# Patient Record
Sex: MALE | Race: WHITE | URBAN - NONMETROPOLITAN AREA
[De-identification: names, ages, dates, MRNs, and addresses within clinical notes are randomized per-mention and may not be internally consistent; named-entity substitution may affect disease eponyms.]

---

## 2022-01-31 ENCOUNTER — OFFICE VISIT (OUTPATIENT)
Dept: URBAN - NONMETROPOLITAN AREA CLINIC 6 | Facility: CLINIC | Age: 32
End: 2022-01-31
Payer: COMMERCIAL

## 2022-01-31 DIAGNOSIS — H52.223 REGULAR ASTIGMATISM, BILATERAL: Primary | ICD-10-CM

## 2022-01-31 DIAGNOSIS — H18.609 KERATOCONUS: ICD-10-CM

## 2022-01-31 PROCEDURE — 92004 COMPRE OPH EXAM NEW PT 1/>: CPT | Performed by: OPTOMETRIST

## 2022-01-31 ASSESSMENT — VISUAL ACUITY
OD: 20/40
OS: 20/40

## 2022-01-31 ASSESSMENT — INTRAOCULAR PRESSURE
OS: 15
OD: 15

## 2022-01-31 NOTE — IMPRESSION/PLAN
Impression: Keratoconus: Y28.983., bilateral Plan: Patient education regarding findings. Recommend patient see a corneal specialist. Discussed treatment options such as crosslinking. Dr. Susan Howard and Dr. Valentin Nogueira information given to patient.

## 2022-01-31 NOTE — IMPRESSION/PLAN
Impression: Regular astigmatism, bilateral: H52.223. Plan: patient education. see plan #2. Advised patient to wait until seeing a corneal specialist before updating glasses or CL at this time.

## 2022-06-14 ENCOUNTER — OFFICE VISIT (OUTPATIENT)
Dept: URBAN - NONMETROPOLITAN AREA CLINIC 6 | Facility: CLINIC | Age: 32
End: 2022-06-14

## 2022-06-14 DIAGNOSIS — H18.621 KERATOCONUS, UNSTABLE, RIGHT EYE: Primary | ICD-10-CM

## 2022-06-14 PROCEDURE — 76514 ECHO EXAM OF EYE THICKNESS: CPT | Performed by: STUDENT IN AN ORGANIZED HEALTH CARE EDUCATION/TRAINING PROGRAM

## 2022-06-14 PROCEDURE — 92025 CPTRIZED CORNEAL TOPOGRAPHY: CPT | Performed by: STUDENT IN AN ORGANIZED HEALTH CARE EDUCATION/TRAINING PROGRAM

## 2022-06-14 PROCEDURE — 99213 OFFICE O/P EST LOW 20 MIN: CPT | Performed by: STUDENT IN AN ORGANIZED HEALTH CARE EDUCATION/TRAINING PROGRAM

## 2022-06-14 ASSESSMENT — INTRAOCULAR PRESSURE
OS: 14
OD: 13

## 2022-06-14 NOTE — IMPRESSION/PLAN
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 Plan: Patient ed on findings. Questions regarding scleral lenses answered today. Fit performed. Will order new lens according to fit seen in clinic. Will call patient when lens arrives. Discussed diagnosis in detail with patient. Recommend scleral lens fitting. Will coordinate with eye care plan/medical insurance for billing. Scleral lens contract given and explained. Questions answered. [place]ide completed today. Consult and fitting done same day as patient has CXL scheduled with Dr. Randy Jorge in 2 weeks. Expedite shipping.

## 2022-06-20 ENCOUNTER — OFFICE VISIT (OUTPATIENT)
Dept: URBAN - NONMETROPOLITAN AREA CLINIC 6 | Facility: CLINIC | Age: 32
End: 2022-06-20
Payer: COMMERCIAL

## 2022-06-20 DIAGNOSIS — H52.223 REGULAR ASTIGMATISM, BILATERAL: ICD-10-CM

## 2022-06-20 DIAGNOSIS — H18.621 KERATOCONUS, UNSTABLE, RIGHT EYE: Primary | ICD-10-CM

## 2022-06-20 PROCEDURE — 92310 CONTACT LENS FITTING OU: CPT | Performed by: STUDENT IN AN ORGANIZED HEALTH CARE EDUCATION/TRAINING PROGRAM

## 2022-06-20 NOTE — IMPRESSION/PLAN
Impression: Keratoconus, unstable, right eye: T04.465. Plan: Patient ed on findings. Lenses are okay to dispense. Comfort/fit/vision are adequate. All paperwork has been scanned into chart. Contract has been signed. RTC 3-4 weeks for scleral lens f/u. I&R successfully completed today. Hygiene reviewed and handout given. Pt questions answered.

## 2022-07-01 ENCOUNTER — OFFICE VISIT (OUTPATIENT)
Dept: URBAN - NONMETROPOLITAN AREA CLINIC 6 | Facility: CLINIC | Age: 32
End: 2022-07-01

## 2022-07-01 DIAGNOSIS — H18.621 KERATOCONUS, UNSTABLE, RIGHT EYE: Primary | ICD-10-CM

## 2022-07-01 PROCEDURE — 92310 CONTACT LENS FITTING OU: CPT | Performed by: STUDENT IN AN ORGANIZED HEALTH CARE EDUCATION/TRAINING PROGRAM

## 2022-07-01 NOTE — IMPRESSION/PLAN
Impression: Keratoconus, unstable, right eye: J25.382. Plan: Patient ed on findings. Comfort/fit/vision are adequate. All paperwork has been scanned into chart. RTC 5 months for scleral f/u. To be billed medical. Call if pain or vision worsening. Patient has upcoming sx on OD. Patient okay to proceed. Call before putting in sclerals after CXL OD to verify fit is still good OD.  May need to repeat with new fit if changes to K s/p CXL

## 2022-12-28 ENCOUNTER — OFFICE VISIT (OUTPATIENT)
Dept: URBAN - NONMETROPOLITAN AREA CLINIC 6 | Facility: CLINIC | Age: 32
End: 2022-12-28
Payer: COMMERCIAL

## 2022-12-28 DIAGNOSIS — H18.621 KERATOCONUS, UNSTABLE, RIGHT EYE: Primary | ICD-10-CM

## 2022-12-28 PROCEDURE — 99213 OFFICE O/P EST LOW 20 MIN: CPT | Performed by: STUDENT IN AN ORGANIZED HEALTH CARE EDUCATION/TRAINING PROGRAM

## 2022-12-28 ASSESSMENT — INTRAOCULAR PRESSURE
OS: 18
OD: 15

## 2022-12-28 NOTE — IMPRESSION/PLAN
Impression: Keratoconus, unstable, right eye: U57.588. Plan: Patient ed on findings. RTC 1-4 weeks for scleral f/u. To be billed medical. Call if pain or vision worsening. CXL appears to have corneal architecture may need new fit. Light corneal touch noted @2 Landing zone appears adequate. RTC for fit check in afternoon after lens has settled.

## 2023-01-13 ENCOUNTER — OFFICE VISIT (OUTPATIENT)
Dept: URBAN - NONMETROPOLITAN AREA CLINIC 6 | Facility: CLINIC | Age: 33
End: 2023-01-13

## 2023-01-13 DIAGNOSIS — H18.621 KERATOCONUS, UNSTABLE, RIGHT EYE: Primary | ICD-10-CM

## 2023-01-13 DIAGNOSIS — H52.223 REGULAR ASTIGMATISM, BILATERAL: ICD-10-CM

## 2023-01-13 PROCEDURE — 92310 CONTACT LENS FITTING OU: CPT | Performed by: STUDENT IN AN ORGANIZED HEALTH CARE EDUCATION/TRAINING PROGRAM

## 2023-01-13 NOTE — IMPRESSION/PLAN
Impression: Keratoconus, unstable, right eye: E77.677. Plan: Patient ed on findings. Minimal sag at 2 o'c on lens. Recommend refit to change so sag is more sufficient at 2 o'c. Vision good. Comfort good. Will call [facility] to start new order. When new lenses arrive, patient is okay to  scleral lens, and set up 1 week f/u with Dr. [facility] at that time. Do not need to see patient same day as scleral dispense. Ant seg OCT taken today. See axis. Spoke with representative Karissa. She recommends 1 step of minus to LITE zone and increasing central clearance by 50 microns.  [id number]

## 2023-01-30 ENCOUNTER — OFFICE VISIT (OUTPATIENT)
Dept: URBAN - NONMETROPOLITAN AREA CLINIC 6 | Facility: CLINIC | Age: 33
End: 2023-01-30
Payer: COMMERCIAL

## 2023-01-30 DIAGNOSIS — H52.223 REGULAR ASTIGMATISM, BILATERAL: ICD-10-CM

## 2023-01-30 DIAGNOSIS — H18.621 KERATOCONUS, UNSTABLE, RIGHT EYE: Primary | ICD-10-CM

## 2023-01-30 PROCEDURE — 92310 CONTACT LENS FITTING OU: CPT | Performed by: STUDENT IN AN ORGANIZED HEALTH CARE EDUCATION/TRAINING PROGRAM

## 2023-01-30 NOTE — IMPRESSION/PLAN
Impression: Keratoconus, unstable, right eye: L86.679. Plan: Patient presents for CL scleral f/u. Vision 20/20 in current lens. 2 o'c sag sufficient. Measured on ant seg OCT at 120 um today. No further changes. F/u 6 months.

## 2023-07-31 ENCOUNTER — OFFICE VISIT (OUTPATIENT)
Dept: URBAN - NONMETROPOLITAN AREA CLINIC 6 | Facility: CLINIC | Age: 33
End: 2023-07-31
Payer: COMMERCIAL

## 2023-07-31 DIAGNOSIS — H18.611 KERATOCONUS, STABLE, RIGHT EYE: Primary | ICD-10-CM

## 2023-07-31 PROCEDURE — 99213 OFFICE O/P EST LOW 20 MIN: CPT | Performed by: STUDENT IN AN ORGANIZED HEALTH CARE EDUCATION/TRAINING PROGRAM
